# Patient Record
Sex: FEMALE | Race: ASIAN | Employment: UNEMPLOYED | URBAN - METROPOLITAN AREA
[De-identification: names, ages, dates, MRNs, and addresses within clinical notes are randomized per-mention and may not be internally consistent; named-entity substitution may affect disease eponyms.]

---

## 2019-10-01 ENCOUNTER — OFFICE VISIT (OUTPATIENT)
Dept: INTEGRATIVE MEDICINE | Facility: CLINIC | Age: 44
End: 2019-10-01
Payer: COMMERCIAL

## 2019-10-01 ENCOUNTER — LAB ENCOUNTER (OUTPATIENT)
Dept: LAB | Facility: REFERENCE LAB | Age: 44
End: 2019-10-01
Attending: PHYSICIAN ASSISTANT
Payer: COMMERCIAL

## 2019-10-01 VITALS
HEIGHT: 66 IN | DIASTOLIC BLOOD PRESSURE: 60 MMHG | OXYGEN SATURATION: 98 % | HEART RATE: 66 BPM | TEMPERATURE: 98 F | SYSTOLIC BLOOD PRESSURE: 102 MMHG | WEIGHT: 120.63 LBS | BODY MASS INDEX: 19.39 KG/M2

## 2019-10-01 DIAGNOSIS — R20.0 NUMBNESS AND TINGLING OF HAND: ICD-10-CM

## 2019-10-01 DIAGNOSIS — Z86.39 HISTORY OF THYROID NODULE: Primary | ICD-10-CM

## 2019-10-01 DIAGNOSIS — R68.84 JAW PAIN, NON-TMJ: ICD-10-CM

## 2019-10-01 DIAGNOSIS — Z86.39 HISTORY OF THYROID NODULE: ICD-10-CM

## 2019-10-01 DIAGNOSIS — R20.2 NUMBNESS AND TINGLING OF HAND: ICD-10-CM

## 2019-10-01 PROCEDURE — 82728 ASSAY OF FERRITIN: CPT

## 2019-10-01 PROCEDURE — 86800 THYROGLOBULIN ANTIBODY: CPT

## 2019-10-01 PROCEDURE — 84439 ASSAY OF FREE THYROXINE: CPT

## 2019-10-01 PROCEDURE — 84443 ASSAY THYROID STIM HORMONE: CPT

## 2019-10-01 PROCEDURE — 84481 FREE ASSAY (FT-3): CPT

## 2019-10-01 PROCEDURE — 99204 OFFICE O/P NEW MOD 45 MIN: CPT | Performed by: PHYSICIAN ASSISTANT

## 2019-10-01 PROCEDURE — 36415 COLL VENOUS BLD VENIPUNCTURE: CPT

## 2019-10-01 PROCEDURE — 82306 VITAMIN D 25 HYDROXY: CPT

## 2019-10-01 PROCEDURE — 82607 VITAMIN B-12: CPT

## 2019-10-01 PROCEDURE — 82746 ASSAY OF FOLIC ACID SERUM: CPT

## 2019-10-01 PROCEDURE — 86376 MICROSOMAL ANTIBODY EACH: CPT

## 2019-10-01 NOTE — PROGRESS NOTES
Jeff Smyth is a 38year old female. Patient presents with:  Pain: comes and goes  , L jaw/throat radiating pain x 2 weks , pt does grind teeth ,   Numbness: L hand numbness x 1 month       HPI:   Month ago had numbness in left hand.  Thought had carpal Albuterol Sulfate HFA (PROAIR HFA) 108 (90 BASE) MCG/ACT Inhalation Aero Soln Inhale 2 puffs into the lungs every 4 (four) hours as needed for Wheezing or Shortness of Breath.  Disp: 1 Inhaler Rfl: 0   Mometasone Furo-Formoterol Fum (DULERA) 200-5 MCG/ACT I 10/01/19  1034   BP: 102/60   Pulse: 66   Temp: 98.3 °F (36.8 °C)   SpO2: 98%   Weight: 120 lb 9.6 oz (54.7 kg)   Height: 66\"       Physical Exam   Constitutional: She is oriented to person, place, and time and well-developed, well-nourished, and in no d 3. Jaw pain, non-TMJ  - FERRITIN; Future  - FOLIC ACID SERUM(FOLATE); Future  - VITAMIN D, 25-HYDROXY; Future  - VITAMIN B12; Future  - ASSAY, THYROID STIM HORMONE; Future  - FREE T3 (TRIIODOTHYRONINE); Future  - FREE T4 (FREE THYROXINE);  Future  - THYROID Why: Magnesium is a cofactor in more than 300 enzyme systems that regulate diverse biochemical reactions in the body, including protein synthesis, muscle and nerve function, blood glucose control, and blood pressure regulation.  Magnesium is required for en

## 2019-10-01 NOTE — PATIENT INSTRUCTIONS
Proper Hydration with WATER is KEY  The general rule of thumb for optimal water intake is half your body weight in ounces    OR    Enough water to keep your urine very light yellow to clear EVERY time you go to the washroom    OR    At least 60 ounces of w

## 2019-10-02 ENCOUNTER — HOSPITAL ENCOUNTER (OUTPATIENT)
Dept: ULTRASOUND IMAGING | Age: 44
Discharge: HOME | End: 2019-10-02
Attending: PHYSICIAN ASSISTANT
Payer: COMMERCIAL

## 2019-10-02 DIAGNOSIS — Z86.39 HISTORY OF THYROID NODULE: ICD-10-CM

## 2019-10-02 DIAGNOSIS — R68.84 JAW PAIN, NON-TMJ: ICD-10-CM

## 2019-10-02 PROCEDURE — 76536 US EXAM OF HEAD AND NECK: CPT | Performed by: PHYSICIAN ASSISTANT

## 2019-10-03 NOTE — PROGRESS NOTES
Please call patient. Thyroid labs look normal. All nutrient levels look good as well. Please encourage her to do the things we discussed such as giving up coffee.      Mark Freeman PA-C